# Patient Record
Sex: FEMALE | Race: WHITE | NOT HISPANIC OR LATINO | Employment: FULL TIME | URBAN - METROPOLITAN AREA
[De-identification: names, ages, dates, MRNs, and addresses within clinical notes are randomized per-mention and may not be internally consistent; named-entity substitution may affect disease eponyms.]

---

## 2017-10-30 ENCOUNTER — HOSPITAL ENCOUNTER (EMERGENCY)
Facility: HOSPITAL | Age: 37
Discharge: HOME | End: 2017-10-30
Attending: EMERGENCY MEDICINE | Admitting: EMERGENCY MEDICINE

## 2017-10-30 ENCOUNTER — APPOINTMENT (OUTPATIENT)
Dept: CT IMAGING | Facility: HOSPITAL | Age: 37
End: 2017-10-30

## 2017-10-30 VITALS
BODY MASS INDEX: 37.56 KG/M2 | RESPIRATION RATE: 16 BRPM | DIASTOLIC BLOOD PRESSURE: 66 MMHG | SYSTOLIC BLOOD PRESSURE: 112 MMHG | WEIGHT: 220 LBS | OXYGEN SATURATION: 98 % | TEMPERATURE: 98.1 F | HEART RATE: 93 BPM | HEIGHT: 64 IN

## 2017-10-30 DIAGNOSIS — R93.5 ABNORMAL CT OF THE ABDOMEN: ICD-10-CM

## 2017-10-30 DIAGNOSIS — K52.9 COLITIS PRESUMED INFECTIOUS: Primary | ICD-10-CM

## 2017-10-30 LAB
ALBUMIN SERPL-MCNC: 3.9 G/DL (ref 3.5–5.2)
ALBUMIN/GLOB SERPL: 1.1 G/DL
ALP SERPL-CCNC: 72 U/L (ref 39–117)
ALT SERPL W P-5'-P-CCNC: 24 U/L (ref 1–33)
ANION GAP SERPL CALCULATED.3IONS-SCNC: 14.3 MMOL/L
AST SERPL-CCNC: 20 U/L (ref 1–32)
BASOPHILS # BLD AUTO: 0.03 10*3/MM3 (ref 0–0.2)
BASOPHILS NFR BLD AUTO: 0.4 % (ref 0–1.5)
BILIRUB SERPL-MCNC: 0.4 MG/DL (ref 0.1–1.2)
BUN BLD-MCNC: 9 MG/DL (ref 6–20)
BUN/CREAT SERPL: 9.4 (ref 7–25)
CALCIUM SPEC-SCNC: 9.1 MG/DL (ref 8.6–10.5)
CHLORIDE SERPL-SCNC: 103 MMOL/L (ref 98–107)
CO2 SERPL-SCNC: 20.7 MMOL/L (ref 22–29)
CREAT BLD-MCNC: 0.96 MG/DL (ref 0.57–1)
DEPRECATED RDW RBC AUTO: 39.1 FL (ref 37–54)
EOSINOPHIL # BLD AUTO: 0.07 10*3/MM3 (ref 0–0.7)
EOSINOPHIL NFR BLD AUTO: 0.8 % (ref 0.3–6.2)
ERYTHROCYTE [DISTWIDTH] IN BLOOD BY AUTOMATED COUNT: 12.1 % (ref 11.7–13)
GFR SERPL CREATININE-BSD FRML MDRD: 65 ML/MIN/1.73
GLOBULIN UR ELPH-MCNC: 3.5 GM/DL
GLUCOSE BLD-MCNC: 92 MG/DL (ref 65–99)
HCT VFR BLD AUTO: 39.8 % (ref 35.6–45.5)
HGB BLD-MCNC: 14.4 G/DL (ref 11.9–15.5)
HOLD SPECIMEN: NORMAL
HOLD SPECIMEN: NORMAL
IMM GRANULOCYTES # BLD: 0.02 10*3/MM3 (ref 0–0.03)
IMM GRANULOCYTES NFR BLD: 0.2 % (ref 0–0.5)
LYMPHOCYTES # BLD AUTO: 1.64 10*3/MM3 (ref 0.9–4.8)
LYMPHOCYTES NFR BLD AUTO: 19.2 % (ref 19.6–45.3)
MCH RBC QN AUTO: 32.2 PG (ref 26.9–32)
MCHC RBC AUTO-ENTMCNC: 36.2 G/DL (ref 32.4–36.3)
MCV RBC AUTO: 89 FL (ref 80.5–98.2)
MONOCYTES # BLD AUTO: 1.1 10*3/MM3 (ref 0.2–1.2)
MONOCYTES NFR BLD AUTO: 12.9 % (ref 5–12)
NEUTROPHILS # BLD AUTO: 5.66 10*3/MM3 (ref 1.9–8.1)
NEUTROPHILS NFR BLD AUTO: 66.5 % (ref 42.7–76)
PLATELET # BLD AUTO: 164 10*3/MM3 (ref 140–500)
PMV BLD AUTO: 11.3 FL (ref 6–12)
POTASSIUM BLD-SCNC: 3.5 MMOL/L (ref 3.5–5.2)
PROT SERPL-MCNC: 7.4 G/DL (ref 6–8.5)
RBC # BLD AUTO: 4.47 10*6/MM3 (ref 3.9–5.2)
SODIUM BLD-SCNC: 138 MMOL/L (ref 136–145)
WBC NRBC COR # BLD: 8.52 10*3/MM3 (ref 4.5–10.7)
WHOLE BLOOD HOLD SPECIMEN: NORMAL
WHOLE BLOOD HOLD SPECIMEN: NORMAL

## 2017-10-30 PROCEDURE — 96360 HYDRATION IV INFUSION INIT: CPT

## 2017-10-30 PROCEDURE — 80053 COMPREHEN METABOLIC PANEL: CPT | Performed by: EMERGENCY MEDICINE

## 2017-10-30 PROCEDURE — 85025 COMPLETE CBC W/AUTO DIFF WBC: CPT | Performed by: EMERGENCY MEDICINE

## 2017-10-30 PROCEDURE — 99284 EMERGENCY DEPT VISIT MOD MDM: CPT

## 2017-10-30 PROCEDURE — 0 IOPAMIDOL 61 % SOLUTION: Performed by: EMERGENCY MEDICINE

## 2017-10-30 PROCEDURE — 74177 CT ABD & PELVIS W/CONTRAST: CPT

## 2017-10-30 RX ORDER — SODIUM CHLORIDE 0.9 % (FLUSH) 0.9 %
10 SYRINGE (ML) INJECTION AS NEEDED
Status: DISCONTINUED | OUTPATIENT
Start: 2017-10-30 | End: 2017-10-30 | Stop reason: HOSPADM

## 2017-10-30 RX ORDER — METRONIDAZOLE 500 MG/1
500 TABLET ORAL 3 TIMES DAILY
Qty: 30 TABLET | Refills: 0 | Status: SHIPPED | OUTPATIENT
Start: 2017-10-30 | End: 2018-10-29 | Stop reason: HOSPADM

## 2017-10-30 RX ADMIN — SODIUM CHLORIDE 500 ML: 9 INJECTION, SOLUTION INTRAVENOUS at 10:53

## 2017-10-30 RX ADMIN — IOPAMIDOL 85 ML: 612 INJECTION, SOLUTION INTRAVENOUS at 11:50

## 2018-02-08 ENCOUNTER — TRANSCRIBE ORDERS (OUTPATIENT)
Dept: ADMINISTRATIVE | Facility: HOSPITAL | Age: 38
End: 2018-02-08

## 2018-02-08 DIAGNOSIS — Z12.39 SCREENING BREAST EXAMINATION: Primary | ICD-10-CM

## 2018-02-22 ENCOUNTER — HOSPITAL ENCOUNTER (OUTPATIENT)
Dept: MAMMOGRAPHY | Facility: HOSPITAL | Age: 38
Discharge: HOME | End: 2018-02-22
Attending: OBSTETRICS & GYNECOLOGY | Admitting: OBSTETRICS & GYNECOLOGY

## 2018-02-22 DIAGNOSIS — Z12.39 SCREENING BREAST EXAMINATION: ICD-10-CM

## 2018-02-22 PROCEDURE — 77067 SCR MAMMO BI INCL CAD: CPT

## 2018-02-22 PROCEDURE — 77063 BREAST TOMOSYNTHESIS BI: CPT

## 2018-05-01 ENCOUNTER — TRANSCRIBE ORDERS (OUTPATIENT)
Dept: ADMINISTRATIVE | Facility: HOSPITAL | Age: 38
End: 2018-05-01

## 2018-05-01 DIAGNOSIS — R59.1 LYMPHADENOPATHY: Primary | ICD-10-CM

## 2018-10-29 ENCOUNTER — OFFICE VISIT (OUTPATIENT)
Dept: OBSTETRICS AND GYNECOLOGY | Facility: CLINIC | Age: 38
End: 2018-10-29

## 2018-10-29 VITALS
SYSTOLIC BLOOD PRESSURE: 118 MMHG | BODY MASS INDEX: 39.27 KG/M2 | HEIGHT: 64 IN | DIASTOLIC BLOOD PRESSURE: 80 MMHG | WEIGHT: 230 LBS

## 2018-10-29 DIAGNOSIS — Z30.433 ENCOUNTER FOR IUD REMOVAL AND REINSERTION: ICD-10-CM

## 2018-10-29 DIAGNOSIS — Z30.09 COUNSELING FOR BIRTH CONTROL REGARDING INTRAUTERINE DEVICE (IUD): Primary | ICD-10-CM

## 2018-10-29 PROCEDURE — 58301 REMOVE INTRAUTERINE DEVICE: CPT | Performed by: OBSTETRICS & GYNECOLOGY

## 2018-10-29 PROCEDURE — 99202 OFFICE O/P NEW SF 15 MIN: CPT | Performed by: OBSTETRICS & GYNECOLOGY

## 2018-10-29 PROCEDURE — 58300 INSERT INTRAUTERINE DEVICE: CPT | Performed by: OBSTETRICS & GYNECOLOGY

## 2018-10-29 NOTE — PROGRESS NOTES
Subjective   Chitra Mayfield is a 38 y.o. female.     Cc:  Contraception    History of Present Illness - Patient is a 38 year old female who presents for IUD placement.  Her last IUD was placed over 5 years ago.  It is Mirena IUD.  She does not have periods and would prefer another IUD.  This is her first visit to my office.    The following portions of the patient's history were reviewed and updated as appropriate:   She  has no past medical history on file.  She  has a past surgical history that includes Skin graft (Right); Incontinence surgery; and Tonsillectomy.  Her family history includes Arthritis in her mother; Breast cancer in her mother; Colon cancer in her maternal grandmother; Crohn's disease in her mother; Kawasaki disease in her father; Stroke in her paternal grandfather.  She  reports that she has been smoking.  She does not have any smokeless tobacco history on file. She reports that she drinks alcohol. She reports that she does not use drugs.  No current outpatient prescriptions on file.     No current facility-administered medications for this visit.      She has No Known Allergies..    Review of Systems   Constitutional: Negative for chills and fever.   Genitourinary: Negative for menstrual problem, pelvic pain, vaginal bleeding and vaginal pain.     Objective   Physical Exam   Constitutional: She appears well-developed and well-nourished.   HENT:   Right Ear: External ear normal.   Left Ear: External ear normal.   Nose: Nose normal.   Genitourinary: Vagina normal. Pelvic exam was performed with patient supine. There is no tenderness or lesion on the right labia. There is no tenderness or lesion on the left labia. Cervix exhibits no motion tenderness, no discharge and no friability. No foreign body in the vagina. No vaginal discharge found.   Musculoskeletal: Normal range of motion. She exhibits no edema.   Neurological: She is alert. Coordination normal.   Skin: Skin is warm and dry.    Psychiatric: She has a normal mood and affect. Her behavior is normal. Judgment and thought content normal.   Vitals reviewed.    IUD Removal Procedure Note    Type of IUD:  Mirena  Date of insertion:  Over 5 years ago.  Reason for removal:  Device expiration  Other relevant history/information:  none    Procedure Time Out Documentation      Procedure Details  IUD strings visible:  yes  Local anesthesia:  None  Tenaculum used:  None  Removal:  IUD strings grasped and IUD removed intact with gentle traction.  The patient tolerated the procedure well.    All appropriate instructions regarding removal were reviewed.    Patient tolerated the procedure well without complications.    Plans for contraception:  IUD    Other follow-up needed:  none    The patient was advised to call for any fever or for prolonged or severe pain or bleeding. She was advised to use NSAID as needed for mild to moderate pain.   IUD Insertion Procedure Note    Pre-operative Diagnosis: Desires Contraception    Post-operative Diagnosis: same    Indications: contraception    Procedure Details   Urine pregnancy test was not done.  The risks (including infection, bleeding, pain, and uterine perforation) and benefits of the procedure were explained to the patient and Verbal informed consent was obtained.      Cervix cleansed with Betadine. Uterus sounded to 7 cm. IUD inserted without difficulty. String visible and trimmed.    IUD Information:  Mirena, Lot # [id number], Expiration date 4/2021, NDC 07171-295-87.    Condition:  Stable    Complications:  None  Patient tolerated the procedure well without complications.    Plan:    The patient was advised to call for any fever or for prolonged or severe pain or bleeding. She was advised to use NSAID as needed for mild to moderate pain.     Assessment/Plan   Chitra was seen today for gynecologic exam.    Diagnoses and all orders for this visit:    Counseling for birth control regarding intrauterine device  (IUD)        -     Patient's history was taken and she wanted to proceed with IUD placement.  Encounter for IUD removal and reinsertion  -     levonorgestrel (MIRENA) 20 MCG/24HR IUD; by Intrauterine route 1 (One) Time.  -     Tolerated well.  Follow up in 4 weeks for IUD string check.    Facundo Ivory MD

## 2019-03-12 ENCOUNTER — TRANSCRIBE ORDERS (OUTPATIENT)
Dept: ADMINISTRATIVE | Facility: HOSPITAL | Age: 39
End: 2019-03-12

## 2019-03-12 DIAGNOSIS — Z12.39 BREAST CANCER SCREENING: Primary | ICD-10-CM

## 2019-03-26 ENCOUNTER — HOSPITAL ENCOUNTER (OUTPATIENT)
Dept: MAMMOGRAPHY | Facility: HOSPITAL | Age: 39
Discharge: HOME | End: 2019-03-26
Admitting: OBSTETRICS & GYNECOLOGY

## 2019-03-26 DIAGNOSIS — Z12.39 BREAST CANCER SCREENING: ICD-10-CM

## 2019-03-26 PROCEDURE — 77067 SCR MAMMO BI INCL CAD: CPT

## 2019-03-26 PROCEDURE — 77063 BREAST TOMOSYNTHESIS BI: CPT

## 2021-03-31 ENCOUNTER — BULK ORDERING (OUTPATIENT)
Dept: CASE MANAGEMENT | Facility: OTHER | Age: 41
End: 2021-03-31

## 2021-03-31 DIAGNOSIS — Z23 IMMUNIZATION DUE: ICD-10-CM

## 2021-04-30 ENCOUNTER — APPOINTMENT (OUTPATIENT)
Dept: WOMENS IMAGING | Facility: HOSPITAL | Age: 41
End: 2021-04-30

## 2021-04-30 ENCOUNTER — PROCEDURE VISIT (OUTPATIENT)
Dept: OBSTETRICS AND GYNECOLOGY | Facility: CLINIC | Age: 41
End: 2021-04-30

## 2021-04-30 ENCOUNTER — OFFICE VISIT (OUTPATIENT)
Dept: OBSTETRICS AND GYNECOLOGY | Facility: CLINIC | Age: 41
End: 2021-04-30

## 2021-04-30 VITALS
WEIGHT: 213 LBS | SYSTOLIC BLOOD PRESSURE: 113 MMHG | BODY MASS INDEX: 36.37 KG/M2 | DIASTOLIC BLOOD PRESSURE: 71 MMHG | HEIGHT: 64 IN

## 2021-04-30 DIAGNOSIS — Z12.31 VISIT FOR SCREENING MAMMOGRAM: Primary | ICD-10-CM

## 2021-04-30 DIAGNOSIS — Z72.0 TOBACCO USE: ICD-10-CM

## 2021-04-30 DIAGNOSIS — Z01.419 VISIT FOR GYNECOLOGIC EXAMINATION: Primary | ICD-10-CM

## 2021-04-30 PROCEDURE — 99396 PREV VISIT EST AGE 40-64: CPT | Performed by: OBSTETRICS & GYNECOLOGY

## 2021-04-30 PROCEDURE — 77067 SCR MAMMO BI INCL CAD: CPT | Performed by: OBSTETRICS & GYNECOLOGY

## 2021-04-30 PROCEDURE — 77063 BREAST TOMOSYNTHESIS BI: CPT | Performed by: OBSTETRICS & GYNECOLOGY

## 2021-04-30 PROCEDURE — 77067 SCR MAMMO BI INCL CAD: CPT | Performed by: RADIOLOGY

## 2021-04-30 PROCEDURE — 77063 BREAST TOMOSYNTHESIS BI: CPT | Performed by: RADIOLOGY

## 2021-04-30 NOTE — PATIENT INSTRUCTIONS
Steps to Quit Smoking  Smoking tobacco is the leading cause of preventable death. It can affect almost every organ in the body. Smoking puts you and those around you at risk for developing many serious chronic diseases. Quitting smoking can be difficult, but it is one of the best things that you can do for your health. It is never too late to quit.  How do I get ready to quit?  When you decide to quit smoking, create a plan to help you succeed. Before you quit:  · Pick a date to quit. Set a date within the next 2 weeks to give you time to prepare.  · Write down the reasons why you are quitting. Keep this list in places where you will see it often.  · Tell your family, friends, and co-workers that you are quitting. Support from your loved ones can make quitting easier.  · Talk with your health care provider about your options for quitting smoking.  · Find out what treatment options are covered by your health insurance.  · Identify people, places, things, and activities that make you want to smoke (triggers). Avoid them.  What first steps can I take to quit smoking?  · Throw away all cigarettes at home, at work, and in your car.  · Throw away smoking accessories, such as ashtrays and lighters.  · Clean your car. Make sure to empty the ashtray.  · Clean your home, including curtains and carpets.  What strategies can I use to quit smoking?  Talk with your health care provider about combining strategies, such as taking medicines while you are also receiving in-person counseling. Using these two strategies together makes you more likely to succeed in quitting than if you used either strategy on its own.  · If you are pregnant or breastfeeding, talk with your health care provider about finding counseling or other support strategies to quit smoking. Do not take medicine to help you quit smoking unless your health care provider tells you to do so.  To quit smoking:  Quit right away  · Quit smoking completely, instead of  gradually reducing how much you smoke over a period of time. Research shows that stopping smoking right away is more successful than gradually quitting.  · Attend in-person counseling to help you build problem-solving skills. You are more likely to succeed in quitting if you attend counseling sessions regularly. Even short sessions of 10 minutes can be effective.  Take medicine  You may take medicines to help you quit smoking. Some medicines require a prescription and some you can purchase over-the-counter. Medicines may have nicotine in them to replace the nicotine in cigarettes. Medicines may:  · Help to stop cravings.  · Help to relieve withdrawal symptoms.  Your health care provider may recommend:  · Nicotine patches, gum, or lozenges.  · Nicotine inhalers or sprays.  · Non-nicotine medicine that is taken by mouth.  Find resources  Find resources and support systems that can help you to quit smoking and remain smoke-free after you quit. These resources are most helpful when you use them often. They include:  · Online chats with a counselor.  · Telephone quitlines.  · Printed self-help materials.  · Support groups or group counseling.  · Text messaging programs.  · Mobile phone apps or applications. Use apps that can help you stick to your quit plan by providing reminders, tips, and encouragement. There are many free apps for mobile devices as well as websites. Examples include Quit Guide from the CDC and smokefree.gov  What things can I do to make it easier to quit?    · Reach out to your family and friends for support and encouragement. Call telephone quitlines (2-253-QUIT-NOW), reach out to support groups, or work with a counselor for support.  · Ask people who smoke to avoid smoking around you.  · Avoid places that trigger you to smoke, such as bars, parties, or smoke-break areas at work.  · Spend time with people who do not smoke.  · Lessen the stress in your life. Stress can be a smoking trigger for some  people. To lessen stress, try:  ? Exercising regularly.  ? Doing deep-breathing exercises.  ? Doing yoga.  ? Meditating.  ? Performing a body scan. This involves closing your eyes, scanning your body from head to toe, and noticing which parts of your body are particularly tense. Try to relax the muscles in those areas.  How will I feel when I quit smoking?  Day 1 to 3 weeks  Within the first 24 hours of quitting smoking, you may start to feel withdrawal symptoms. These symptoms are usually most noticeable 2-3 days after quitting, but they usually do not last for more than 2-3 weeks. You may experience these symptoms:  · Mood swings.  · Restlessness, anxiety, or irritability.  · Trouble concentrating.  · Dizziness.  · Strong cravings for sugary foods and nicotine.  · Mild weight gain.  · Constipation.  · Nausea.  · Coughing or a sore throat.  · Changes in how the medicines that you take for unrelated issues work in your body.  · Depression.  · Trouble sleeping (insomnia).  Week 3 and afterward  After the first 2-3 weeks of quitting, you may start to notice more positive results, such as:  · Improved sense of smell and taste.  · Decreased coughing and sore throat.  · Slower heart rate.  · Lower blood pressure.  · Clearer skin.  · The ability to breathe more easily.  · Fewer sick days.  Quitting smoking can be very challenging. Do not get discouraged if you are not successful the first time. Some people need to make many attempts to quit before they achieve long-term success. Do your best to stick to your quit plan, and talk with your health care provider if you have any questions or concerns.  Summary  · Smoking tobacco is the leading cause of preventable death. Quitting smoking is one of the best things that you can do for your health.  · When you decide to quit smoking, create a plan to help you succeed.  · Quit smoking right away, not slowly over a period of time.  · When you start quitting, seek help from your  health care provider, family, or friends.  This information is not intended to replace advice given to you by your health care provider. Make sure you discuss any questions you have with your health care provider.  Document Revised: 09/11/2020 Document Reviewed: 03/07/2020  Elsevier Patient Education © 2021 [employer] Inc.

## 2021-04-30 NOTE — PROGRESS NOTES
"[facility]  [address], [address]  [place], [place] [address]  Phone: [phone] / Fax:  [phone]      2021    Douglas PATEL [facility] [address]    Joelle Britton MD    Chief Complaint   Patient presents with   • Gynecologic Exam     Annual Exam, last pap 2016. Mammogram today. Patient with Mirena IUD in place since 10-29-18.       Chitra Mayfield is here for annual gynecologic exam.  HPI - Patient has IUD in place and has scant cycles.  She did a mammogram today. Her mother has been diagnosed with breast cancer with negative genetic testing.   Her last pap was in 2016.    History reviewed. No pertinent past medical history.    Past Surgical History:   Procedure Laterality Date   • INCONTINENCE SURGERY     • SKIN GRAFT Right    • TONSILLECTOMY         No Known Allergies    Social History     Socioeconomic History   • Marital status:      Spouse name: Not on file   • Number of children: Not on file   • Years of education: Not on file   • Highest education level: Not on file   Tobacco Use   • Smoking status: Current Every Day Smoker   • Tobacco comment: patient urged to quit   Substance and Sexual Activity   • Alcohol use: Yes   • Drug use: No   • Sexual activity: Yes     Birth control/protection: I.U.D.       Family History   Problem Relation Age of Onset   • Breast cancer Mother         X2   • Crohn's disease Mother    • Arthritis Mother    • Kawasaki disease Father    • Stroke Paternal Grandfather    • Colon cancer Maternal Grandmother        No LMP recorded. Patient has had an implant.    OB History        4    Para   2    Term   2            AB   1    Living           SAB   1    TAB        Ectopic        Molar        Multiple        Live Births   3                Vitals:    21 1054   BP: 113/71   Weight: 96.6 kg (213 lb)   Height: 162.6 cm (64\")       Physical Exam  Constitutional:       Appearance: She is well-developed.   Genitourinary:      Pelvic exam " was performed with patient in the lithotomy position.      Vulva, urethra, vagina and uterus normal.      No cervical motion tenderness or lesion.      IUD strings visualized.      Right adnexa not tender or full.      Left adnexa not tender or full.   HENT:      Right Ear: External ear normal.      Left Ear: External ear normal.      Nose: Nose normal.   Eyes:      Conjunctiva/sclera: Conjunctivae normal.   Neck:      Thyroid: No thyromegaly.   Cardiovascular:      Rate and Rhythm: Normal rate and regular rhythm.      Heart sounds: Normal heart sounds.   Pulmonary:      Effort: Pulmonary effort is normal.      Breath sounds: No stridor. No wheezing.   Chest:      Breasts:         Right: No mass or nipple discharge.         Left: No mass or nipple discharge.   Abdominal:      Palpations: Abdomen is soft. There is no mass.      Tenderness: There is no guarding or rebound.   Musculoskeletal:         General: Normal range of motion.      Cervical back: Normal range of motion and neck supple.   Neurological:      Mental Status: She is alert.      Coordination: Coordination normal.   Skin:     General: Skin is warm and dry.   Psychiatric:         Mood and Affect: Mood normal.         Behavior: Behavior normal.         Thought Content: Thought content normal.         Judgment: Judgment normal.   Vitals reviewed.         Diagnoses and all orders for this visit:    1. Visit for gynecologic examination (Primary)  -     IgP, Aptima HPV  -     Discussed importance of regular screening and breast awareness.    2. Tobacco use        -     Discussed importance of quitting smoking.      Facundo Ivory MD

## 2021-05-03 ENCOUNTER — TELEPHONE (OUTPATIENT)
Dept: OBSTETRICS AND GYNECOLOGY | Facility: CLINIC | Age: 41
End: 2021-05-03

## 2021-05-03 LAB
CYTOLOGIST CVX/VAG CYTO: ABNORMAL
CYTOLOGY CVX/VAG DOC CYTO: ABNORMAL
CYTOLOGY CVX/VAG DOC THIN PREP: ABNORMAL
DX ICD CODE: ABNORMAL
HIV 1 & 2 AB SER-IMP: ABNORMAL
HPV I/H RISK 4 DNA CVX QL PROBE+SIG AMP: POSITIVE
OTHER STN SPEC: ABNORMAL
STAT OF ADQ CVX/VAG CYTO-IMP: ABNORMAL

## 2021-05-04 NOTE — TELEPHONE ENCOUNTER
LAW - Her pap was normal but HPV testing was positive.  Please have her return for colposcopy.    Thanks    [place]

## 2021-05-20 ENCOUNTER — PROCEDURE VISIT (OUTPATIENT)
Dept: OBSTETRICS AND GYNECOLOGY | Facility: CLINIC | Age: 41
End: 2021-05-20

## 2021-05-20 VITALS
HEIGHT: 64 IN | BODY MASS INDEX: 36.37 KG/M2 | SYSTOLIC BLOOD PRESSURE: 112 MMHG | DIASTOLIC BLOOD PRESSURE: 76 MMHG | WEIGHT: 213 LBS

## 2021-05-20 DIAGNOSIS — R87.618 PAP SMEAR ABNORMALITY OF CERVIX/HUMAN PAPILLOMAVIRUS (HPV) POSITIVE: Primary | ICD-10-CM

## 2021-05-20 PROCEDURE — 57454 BX/CURETT OF CERVIX W/SCOPE: CPT | Performed by: OBSTETRICS & GYNECOLOGY

## 2021-05-20 NOTE — PROGRESS NOTES
Colposcopy Procedure Note    Indications: Pap smear 1 month ago showed: negative pap with positive high risk HPV. The prior pap showed no abnormalities.  Prior cervical/vaginal disease: none. Prior cervical treatment: no treatment.    Procedure Details   The risks and benefits of the procedure and Verbal informed consent obtained.    Speculum placed in vagina and excellent visualization of cervix achieved, cervix swabbed x 3 with acetic acid solution.    Findings:  Cervix: visible lesion(s) at 6 o'clock; endocervical curettage performed, cervical biopsies taken at 6 o'clock, specimen labelled and sent to pathology and hemostasis achieved with Monsel's solution.  Vaginal inspection: normal without visible lesions.  Vulvar colposcopy: vulvar colposcopy not performed.    Specimens: ectocervical biopsy and endocervical curettage    Complications: none.  Patient tolerated the procedure well without complications.    Plan:  Specimens labelled and sent to Pathology.  Will base further treatment on Pathology findings.  Treatment options discussed with patient.  Discussed biology of HPV and risks of smoking with HPV.  Patient encouraged to quit smoking.    Facundo Ivory MD

## 2021-05-24 LAB
DX ICD CODE: NORMAL
PATH REPORT.FINAL DX SPEC: NORMAL
PATH REPORT.GROSS SPEC: NORMAL
PATH REPORT.RELEVANT HX SPEC: NORMAL
PATH REPORT.SITE OF ORIGIN SPEC: NORMAL
PATHOLOGIST NAME: NORMAL
PAYMENT PROCEDURE: NORMAL

## 2021-06-02 ENCOUNTER — TELEPHONE (OUTPATIENT)
Dept: OBSTETRICS AND GYNECOLOGY | Facility: CLINIC | Age: 41
End: 2021-06-02

## 2021-06-02 NOTE — TELEPHONE ENCOUNTER
Patient aware of results and the need to follow-up. 6-2-21/lw  ----- Message from Facundo Ivory MD sent at 6/1/2021  7:16 PM EDT -----  LAW - Let her know that her testing was normal.  Patient should follow up for follow up in one year.

## 2022-05-02 ENCOUNTER — OFFICE VISIT (OUTPATIENT)
Dept: OBSTETRICS AND GYNECOLOGY | Facility: CLINIC | Age: 42
End: 2022-05-02

## 2022-05-02 ENCOUNTER — PROCEDURE VISIT (OUTPATIENT)
Dept: OBSTETRICS AND GYNECOLOGY | Facility: CLINIC | Age: 42
End: 2022-05-02

## 2022-05-02 ENCOUNTER — APPOINTMENT (OUTPATIENT)
Dept: WOMENS IMAGING | Facility: HOSPITAL | Age: 42
End: 2022-05-02

## 2022-05-02 VITALS
WEIGHT: 230 LBS | BODY MASS INDEX: 39.27 KG/M2 | SYSTOLIC BLOOD PRESSURE: 115 MMHG | HEIGHT: 64 IN | DIASTOLIC BLOOD PRESSURE: 69 MMHG

## 2022-05-02 DIAGNOSIS — Z12.31 VISIT FOR SCREENING MAMMOGRAM: Primary | ICD-10-CM

## 2022-05-02 DIAGNOSIS — R87.618 PAP SMEAR ABNORMALITY OF CERVIX/HUMAN PAPILLOMAVIRUS (HPV) POSITIVE: ICD-10-CM

## 2022-05-02 DIAGNOSIS — Z01.419 VISIT FOR GYNECOLOGIC EXAMINATION: Primary | ICD-10-CM

## 2022-05-02 DIAGNOSIS — Z72.0 TOBACCO USE: ICD-10-CM

## 2022-05-02 DIAGNOSIS — Z80.3 FAMILY HISTORY OF BREAST CANCER: ICD-10-CM

## 2022-05-02 PROCEDURE — 77067 SCR MAMMO BI INCL CAD: CPT | Performed by: RADIOLOGY

## 2022-05-02 PROCEDURE — 77067 SCR MAMMO BI INCL CAD: CPT | Performed by: OBSTETRICS & GYNECOLOGY

## 2022-05-02 PROCEDURE — 99396 PREV VISIT EST AGE 40-64: CPT | Performed by: OBSTETRICS & GYNECOLOGY

## 2022-05-02 PROCEDURE — 77063 BREAST TOMOSYNTHESIS BI: CPT | Performed by: RADIOLOGY

## 2022-05-02 PROCEDURE — 77063 BREAST TOMOSYNTHESIS BI: CPT | Performed by: OBSTETRICS & GYNECOLOGY

## 2022-05-02 NOTE — PROGRESS NOTES
"[facility]  [address], [address]  [place], [place] [address]  Phone: [phone] / Fax:  [phone]      2022    Douglas PATEL [facility] [address]    Joelle Britton MD    Chief Complaint   Patient presents with   • Gynecologic Exam      Annual Exam, last pap 4-30-21 NL HPV (+), Colpo  5-20-21 NL.. Mammogram today. Patient with Mirena IUD in place since 10-29-18.       Chitra Mayfield is here for annual gynecologic exam.  HPI - Patient with HPV positive pap last year.  She underwent colposcopy that was normal.  Patient had normal mammogram last year and underwent screening again today.  She has an IUD in place and does not have periods.    Past Medical History:   Diagnosis Date   • HPV (human papilloma virus) infection        Past Surgical History:   Procedure Laterality Date   • COLPOSCOPY  2021   • INCONTINENCE SURGERY     • SKIN GRAFT Right    • TONSILLECTOMY         No Known Allergies    Social History     Socioeconomic History   • Marital status:    Tobacco Use   • Smoking status: Current Every Day Smoker     Types: Cigarettes   • Tobacco comment: patient urged to quit   Vaping Use   • Vaping Use: Former   Substance and Sexual Activity   • Alcohol use: Not Currently   • Drug use: No   • Sexual activity: Not Currently     Birth control/protection: I.U.D.       Family History   Problem Relation Age of Onset   • Breast cancer Mother         X2   • Crohn's disease Mother    • Arthritis Mother    • Kawasaki disease Father    • Stroke Paternal Grandfather    • Colon cancer Maternal Grandmother        No LMP recorded. Patient has had an implant.    OB History        4    Para   2    Term   2            AB   1    Living           SAB   1    IAB        Ectopic        Molar        Multiple        Live Births   3                Vitals:    22 0911   BP: 115/69   Weight: 104 kg (230 lb)   Height: 162.6 cm (64.02\")       Physical Exam  Constitutional:       " Appearance: Normal appearance. She is well-developed.   Genitourinary:      Bladder normal.      Right Labia: No tenderness or lesions.     Left Labia: No tenderness or lesions.     No vaginal discharge or tenderness.        Right Adnexa: not tender and not full.     Left Adnexa: not tender and not full.     No cervical motion tenderness or lesion.      Uterus is not enlarged or tender.      No urethral tenderness or hypermobility present.   Breasts:      Right: No mass or nipple discharge.      Left: No mass or nipple discharge.       HENT:      Right Ear: External ear normal.      Left Ear: External ear normal.      Nose: Nose normal.   Eyes:      Conjunctiva/sclera: Conjunctivae normal.   Neck:      Thyroid: No thyromegaly.   Cardiovascular:      Rate and Rhythm: Normal rate and regular rhythm.      Heart sounds: Normal heart sounds.   Pulmonary:      Effort: Pulmonary effort is normal.      Breath sounds: No stridor. No wheezing.   Abdominal:      Palpations: Abdomen is soft. There is no mass.      Tenderness: There is no guarding or rebound.   Musculoskeletal:         General: Normal range of motion.      Cervical back: Normal range of motion and neck supple.   Neurological:      Mental Status: She is alert.      Coordination: Coordination normal.   Skin:     General: Skin is warm and dry.   Psychiatric:         Mood and Affect: Mood normal.         Behavior: Behavior normal.         Thought Content: Thought content normal.         Judgment: Judgment normal.   Vitals reviewed. Exam conducted with a chaperone present.         Diagnoses and all orders for this visit:    1. Visit for gynecologic examination (Primary)        -     Discussed importance of regular screening and breast awareness.        -     Patient has been vaccinated against Covid 19.  2. Pap smear abnormality of cervix/human papillomavirus (HPV) positive  -     IgP, Aptima HPV  -     If pap abnormal, consider colposcopy.  3. Family history of  breast cancer        -     Mammogram yearly.  Mother with negative genetic testing.  4. Tobacco use        -     Recommended quitting smoking, especially in context of abnormal pap tests.      Facundo Ivory MD

## 2022-05-02 NOTE — PATIENT INSTRUCTIONS
Steps to Quit Smoking  Smoking tobacco is the leading cause of preventable death. It can affect almost every organ in the body. Smoking puts you and those around you at risk for developing many serious chronic diseases. Quitting smoking can be difficult, but it is one of the best things that you can do for your health. It is never too late to quit.  How do I get ready to quit?  When you decide to quit smoking, create a plan to help you succeed. Before you quit:  · Pick a date to quit. Set a date within the next 2 weeks to give you time to prepare.  · Write down the reasons why you are quitting. Keep this list in places where you will see it often.  · Tell your family, friends, and co-workers that you are quitting. Support from your loved ones can make quitting easier.  · Talk with your health care provider about your options for quitting smoking.  · Find out what treatment options are covered by your health insurance.  · Identify people, places, things, and activities that make you want to smoke (triggers). Avoid them.  What first steps can I take to quit smoking?  · Throw away all cigarettes at home, at work, and in your car.  · Throw away smoking accessories, such as ashtrays and lighters.  · Clean your car. Make sure to empty the ashtray.  · Clean your home, including curtains and carpets.  What strategies can I use to quit smoking?  Talk with your health care provider about combining strategies, such as taking medicines while you are also receiving in-person counseling. Using these two strategies together makes you more likely to succeed in quitting than if you used either strategy on its own.  · If you are pregnant or breastfeeding, talk with your health care provider about finding counseling or other support strategies to quit smoking. Do not take medicine to help you quit smoking unless your health care provider tells you to do so.  To quit smoking:  Quit right away  · Quit smoking completely, instead of  gradually reducing how much you smoke over a period of time. Research shows that stopping smoking right away is more successful than gradually quitting.  · Attend in-person counseling to help you build problem-solving skills. You are more likely to succeed in quitting if you attend counseling sessions regularly. Even short sessions of 10 minutes can be effective.  Take medicine  You may take medicines to help you quit smoking. Some medicines require a prescription and some you can purchase over-the-counter. Medicines may have nicotine in them to replace the nicotine in cigarettes. Medicines may:  · Help to stop cravings.  · Help to relieve withdrawal symptoms.  Your health care provider may recommend:  · Nicotine patches, gum, or lozenges.  · Nicotine inhalers or sprays.  · Non-nicotine medicine that is taken by mouth.  Find resources  Find resources and support systems that can help you to quit smoking and remain smoke-free after you quit. These resources are most helpful when you use them often. They include:  · Online chats with a counselor.  · Telephone quitlines.  · Printed self-help materials.  · Support groups or group counseling.  · Text messaging programs.  · Mobile phone apps or applications. Use apps that can help you stick to your quit plan by providing reminders, tips, and encouragement. There are many free apps for mobile devices as well as websites. Examples include Quit Guide from the CDC and smokefree.gov  What things can I do to make it easier to quit?    · Reach out to your family and friends for support and encouragement. Call telephone quitlines (8-857-QUIT-NOW), reach out to support groups, or work with a counselor for support.  · Ask people who smoke to avoid smoking around you.  · Avoid places that trigger you to smoke, such as bars, parties, or smoke-break areas at work.  · Spend time with people who do not smoke.  · Lessen the stress in your life. Stress can be a smoking trigger for some  people. To lessen stress, try:  ? Exercising regularly.  ? Doing deep-breathing exercises.  ? Doing yoga.  ? Meditating.  ? Performing a body scan. This involves closing your eyes, scanning your body from head to toe, and noticing which parts of your body are particularly tense. Try to relax the muscles in those areas.  How will I feel when I quit smoking?  Day 1 to 3 weeks  Within the first 24 hours of quitting smoking, you may start to feel withdrawal symptoms. These symptoms are usually most noticeable 2-3 days after quitting, but they usually do not last for more than 2-3 weeks. You may experience these symptoms:  · Mood swings.  · Restlessness, anxiety, or irritability.  · Trouble concentrating.  · Dizziness.  · Strong cravings for sugary foods and nicotine.  · Mild weight gain.  · Constipation.  · Nausea.  · Coughing or a sore throat.  · Changes in how the medicines that you take for unrelated issues work in your body.  · Depression.  · Trouble sleeping (insomnia).  Week 3 and afterward  After the first 2-3 weeks of quitting, you may start to notice more positive results, such as:  · Improved sense of smell and taste.  · Decreased coughing and sore throat.  · Slower heart rate.  · Lower blood pressure.  · Clearer skin.  · The ability to breathe more easily.  · Fewer sick days.  Quitting smoking can be very challenging. Do not get discouraged if you are not successful the first time. Some people need to make many attempts to quit before they achieve long-term success. Do your best to stick to your quit plan, and talk with your health care provider if you have any questions or concerns.  Summary  · Smoking tobacco is the leading cause of preventable death. Quitting smoking is one of the best things that you can do for your health.  · When you decide to quit smoking, create a plan to help you succeed.  · Quit smoking right away, not slowly over a period of time.  · When you start quitting, seek help from your  health care provider, family, or friends.  This information is not intended to replace advice given to you by your health care provider. Make sure you discuss any questions you have with your health care provider.  Document Revised: 09/11/2020 Document Reviewed: 03/07/2020  Elsevier Patient Education © 2021 [employer] Inc.

## 2022-05-09 ENCOUNTER — TELEPHONE (OUTPATIENT)
Dept: OBSTETRICS AND GYNECOLOGY | Facility: CLINIC | Age: 42
End: 2022-05-09

## 2022-05-09 NOTE — TELEPHONE ENCOUNTER
Spoke to patient she is aware of Positive HPV and is scheduled for Monday June 6th. 5-9-22/lw  ----- Message from Facundo Ivory MD sent at 5/8/2022 10:14 PM EDT -----  LAW - Let her know that she had another pap tet reading with HPV.  I would like her to follow up with colposcopy in the next 2 to 4 weeks.

## 2022-05-24 ENCOUNTER — TELEPHONE (OUTPATIENT)
Dept: OBSTETRICS AND GYNECOLOGY | Facility: CLINIC | Age: 42
End: 2022-05-24

## 2022-05-24 NOTE — TELEPHONE ENCOUNTER
Caller: ROSALINDA CHAU    Relationship to patient: SELF    Best call back number: [phone]    Patient is needing: ROSALINDA CHAU IS REQUESTING TO RESCHEDULE IN-OFFICE COLPO ON 6/6/22 AT 11:15 W/ DR. BURDICK TO 6/13/22, IF POSSIBLE. PLEASE CALL PT BACK [phone].

## 2022-06-13 ENCOUNTER — PROCEDURE VISIT (OUTPATIENT)
Dept: OBSTETRICS AND GYNECOLOGY | Facility: CLINIC | Age: 42
End: 2022-06-13

## 2022-06-13 VITALS
SYSTOLIC BLOOD PRESSURE: 117 MMHG | BODY MASS INDEX: 39.09 KG/M2 | DIASTOLIC BLOOD PRESSURE: 71 MMHG | WEIGHT: 229 LBS | HEIGHT: 64 IN

## 2022-06-13 DIAGNOSIS — R87.810 PAPANICOLAOU SMEAR OF CERVIX WITH POSITIVE HIGH RISK HUMAN PAPILLOMA VIRUS (HPV) TEST: Primary | ICD-10-CM

## 2022-06-13 PROCEDURE — 57452 EXAM OF CERVIX W/SCOPE: CPT | Performed by: OBSTETRICS & GYNECOLOGY

## 2022-06-13 NOTE — PROGRESS NOTES
Colposcopy Procedure Note    Indications: Pap smear 1 months ago showed: high risk HPV positive test with normal pap. The prior pap showed high risk HPV positive.  Prior cervical/vaginal disease: normal exam without visible pathology. Prior cervical treatment: no treatment.    Procedure Details   The risks and benefits of the procedure and Verbal informed consent obtained.    Speculum placed in vagina and excellent visualization of cervix achieved, cervix swabbed x 3 with acetic acid solution.    Findings:  Cervix: no visible lesions; no biopsies taken.  Vaginal inspection: normal without visible lesions.  Vulvar colposcopy: vulvar colposcopy not performed.    Specimens: none    Complications: none.  Patient tolerated the procedure well without complications.    Plan:  Follow up in one year.  Discussed importance of quitting smoking and importance of pap in one year to reassess for dysplasia.    Facundo Ivory MD

## 2023-05-01 ENCOUNTER — TELEPHONE (OUTPATIENT)
Dept: OBSTETRICS AND GYNECOLOGY | Facility: CLINIC | Age: 43
End: 2023-05-01
Payer: COMMERCIAL

## 2023-05-01 NOTE — TELEPHONE ENCOUNTER
Provider: HEAVENLY  Caller: ROSALINDA CHAU  Relationship to Patient: SELF  Phone Number: [phone]-CALL ANYTIME, IS IT OKAY TO [facility].    Reason for Call: PATIENT IS SCHEDULED FOR ANNUAL AND MAMMOGRAM ON 05.08.23. PATIENT WOULD TO KNOW IF MIRENA CAN BE REPLACED AT ANNUAL APPT.

## 2023-05-08 ENCOUNTER — PROCEDURE VISIT (OUTPATIENT)
Dept: OBSTETRICS AND GYNECOLOGY | Facility: CLINIC | Age: 43
End: 2023-05-08
Payer: COMMERCIAL

## 2023-05-08 ENCOUNTER — OFFICE VISIT (OUTPATIENT)
Dept: OBSTETRICS AND GYNECOLOGY | Facility: CLINIC | Age: 43
End: 2023-05-08
Payer: COMMERCIAL

## 2023-05-08 VITALS
HEIGHT: 64 IN | DIASTOLIC BLOOD PRESSURE: 72 MMHG | SYSTOLIC BLOOD PRESSURE: 109 MMHG | WEIGHT: 243 LBS | BODY MASS INDEX: 41.48 KG/M2

## 2023-05-08 DIAGNOSIS — R87.618 PAP SMEAR ABNORMALITY OF CERVIX/HUMAN PAPILLOMAVIRUS (HPV) POSITIVE: ICD-10-CM

## 2023-05-08 DIAGNOSIS — Z12.31 VISIT FOR SCREENING MAMMOGRAM: Primary | ICD-10-CM

## 2023-05-08 DIAGNOSIS — Z01.419 VISIT FOR GYNECOLOGIC EXAMINATION: Primary | ICD-10-CM

## 2023-05-08 DIAGNOSIS — Z72.0 TOBACCO USE: ICD-10-CM

## 2023-05-08 NOTE — PROGRESS NOTES
[facility]  [address]  [place], [place] [address]  Phone: [phone] / Fax:  [phone]      2023    [address] PAT[address] [facility] [address]    Joelle Britton MD    Chief Complaint   Patient presents with   • Gynecologic Exam      Annual Exam, last pap 5-2-22 NL HPV (+), Colpo 22 No Specimens were taken. -30-21 NL HPV (+), Colpo  --21 NL.. Mammogram today, previous 5-2-22 NL.. Patient with Mirena IUD in place since 10-29-18.       Chitra Mayfield is here for annual gynecologic exam.  HPI - Patient with history of HPV positive pap with negative colposcopy.  Last pap was one year ago.  She had a normal mammogram one year ago and repeated screening today.  She has an IUD in place since 2018 and has scant bleeding.    Past Medical History:   Diagnosis Date   • HPV (human papilloma virus) infection 2021       Past Surgical History:   Procedure Laterality Date   • COLPOSCOPY  2021    WNL   • COLPOSCOPY  2022   • INCONTINENCE SURGERY     • SKIN GRAFT Right    • TONSILLECTOMY         No Known Allergies    Social History     Socioeconomic History   • Marital status:    Tobacco Use   • Smoking status: Every Day     Types: Cigarettes   • Tobacco comments:     patient urged to quit   Vaping Use   • Vaping Use: Former   Substance and Sexual Activity   • Alcohol use: Not Currently   • Drug use: Yes     Types: Marijuana   • Sexual activity: Not Currently     Birth control/protection: I.U.D.       Family History   Problem Relation Age of Onset   • Breast cancer Mother         X2   • Crohn's disease Mother    • Arthritis Mother    • Kawasaki disease Father    • Stroke Paternal Grandfather    • Colon cancer Maternal Grandmother        No LMP recorded. Patient has had an implant.    OB History        4    Para   2    Term   2            AB   1    Living           SAB   1    IAB        Ectopic        Molar        Multiple        Live Births   3  "               Vitals:    05/08/23 0922   BP: 109/72   Weight: 110 kg (243 lb)   Height: 162.6 cm (64.02\")       Physical Exam  Constitutional:       Appearance: Normal appearance. She is well-developed.   Genitourinary:      Urethral meatus normal.      Right Labia: No tenderness or lesions.     Left Labia: No lesions.     No vaginal discharge or tenderness.        Right Adnexa: not tender and not full.     Left Adnexa: not tender and not full.     No cervical motion tenderness or lesion.      IUD strings visualized.      Uterus is not enlarged or tender.      No urethral tenderness or hypermobility present.   Breasts:     Right: No mass or nipple discharge.      Left: No mass or nipple discharge.   HENT:      Right Ear: External ear normal.      Left Ear: External ear normal.      Nose: Nose normal.   Eyes:      Conjunctiva/sclera: Conjunctivae normal.   Neck:      Thyroid: No thyromegaly.   Cardiovascular:      Rate and Rhythm: Normal rate and regular rhythm.      Heart sounds: Normal heart sounds.   Pulmonary:      Effort: Pulmonary effort is normal.      Breath sounds: No stridor. No wheezing.   Abdominal:      Palpations: Abdomen is soft. There is no mass.      Tenderness: There is no guarding or rebound.   Musculoskeletal:         General: Normal range of motion.      Cervical back: Normal range of motion and neck supple.   Neurological:      Mental Status: She is alert.      Coordination: Coordination normal.   Skin:     General: Skin is warm and dry.   Psychiatric:         Behavior: Behavior normal.         Thought Content: Thought content normal.         Judgment: Judgment normal.   Vitals reviewed. Exam conducted with a chaperone present.         Diagnoses and all orders for this visit:    1. Visit for gynecologic examination (Primary)        -     Discussed importance of regular screening and breast awareness        -     Discussed IUD for contraception and interval to change IUD.  2. Pap smear " abnormality of cervix/human papillomavirus (HPV) positive  -     IgP, Aptima HPV  -     If pap abnormal, consider colposcopy OR repeat in one year if unchanged.  3. Tobacco use        -     Discussed importance of quitting smoking.      Facundo Ivory MD

## 2023-05-08 NOTE — PATIENT INSTRUCTIONS
Steps to Quit Smoking  Smoking tobacco is the leading cause of preventable death. It can affect almost every organ in the body. Smoking puts you and those around you at risk for developing many serious chronic diseases. Quitting smoking can be difficult, but it is one of the best things that you can do for your health. It is never too late to quit.  How do I get ready to quit?  When you decide to quit smoking, create a plan to help you succeed. Before you quit:  • Pick a date to quit. Set a date within the next 2 weeks to give you time to prepare.  • Write down the reasons why you are quitting. Keep this list in places where you will see it often.  • Tell your family, friends, and co-workers that you are quitting. Support from your loved ones can make quitting easier.  • Talk with your health care provider about your options for quitting smoking.  • Find out what treatment options are covered by your health insurance.  • Identify people, places, things, and activities that make you want to smoke (triggers). Avoid them.  What first steps can I take to quit smoking?  • Throw away all cigarettes at home, at work, and in your car.  • Throw away smoking accessories, such as ashtrays and lighters.  • Clean your car. Make sure to empty the ashtray.  • Clean your home, including curtains and carpets.  What strategies can I use to quit smoking?  Talk with your health care provider about combining strategies, such as taking medicines while you are also receiving in-person counseling. Using these two strategies together makes you more likely to succeed in quitting than if you used either strategy on its own.  • If you are pregnant or breastfeeding, talk with your health care provider about finding counseling or other support strategies to quit smoking. Do not take medicine to help you quit smoking unless your health care provider tells you to do so.  To quit smoking:  Quit right away  • Quit smoking completely, instead of  gradually reducing how much you smoke over a period of time. Research shows that stopping smoking right away is more successful than gradually quitting.  • Attend in-person counseling to help you build problem-solving skills. You are more likely to succeed in quitting if you attend counseling sessions regularly. Even short sessions of 10 minutes can be effective.  Take medicine  You may take medicines to help you quit smoking. Some medicines require a prescription and some you can purchase over-the-counter. Medicines may have nicotine in them to replace the nicotine in cigarettes. Medicines may:  • Help to stop cravings.  • Help to relieve withdrawal symptoms.  Your health care provider may recommend:  • Nicotine patches, gum, or lozenges.  • Nicotine inhalers or sprays.  • Non-nicotine medicine that is taken by mouth.  Find resources  Find resources and support systems that can help you to quit smoking and remain smoke-free after you quit. These resources are most helpful when you use them often. They include:  • Online chats with a counselor.  • Telephone quitlines.  • Printed self-help materials.  • Support groups or group counseling.  • Text messaging programs.  • Mobile phone apps or applications. Use apps that can help you stick to your quit plan by providing reminders, tips, and encouragement. There are many free apps for mobile devices as well as websites. Examples include Quit Guide from the CDC and smokefree.gov  What things can I do to make it easier to quit?    • Reach out to your family and friends for support and encouragement. Call telephone quitlines (5-589-QUIT-NOW), reach out to support groups, or work with a counselor for support.  • Ask people who smoke to avoid smoking around you.  • Avoid places that trigger you to smoke, such as bars, parties, or smoke-break areas at work.  • Spend time with people who do not smoke.  • Lessen the stress in your life. Stress can be a smoking trigger for some  people. To lessen stress, try:  ? Exercising regularly.  ? Doing deep-breathing exercises.  ? Doing yoga.  ? Meditating.  ? Performing a body scan. This involves closing your eyes, scanning your body from head to toe, and noticing which parts of your body are particularly tense. Try to relax the muscles in those areas.  How will I feel when I quit smoking?  Day 1 to 3 weeks  Within the first 24 hours of quitting smoking, you may start to feel withdrawal symptoms. These symptoms are usually most noticeable 2-3 days after quitting, but they usually do not last for more than 2-3 weeks. You may experience these symptoms:  • Mood swings.  • Restlessness, anxiety, or irritability.  • Trouble concentrating.  • Dizziness.  • Strong cravings for sugary foods and nicotine.  • Mild weight gain.  • Constipation.  • Nausea.  • Coughing or a sore throat.  • Changes in how the medicines that you take for unrelated issues work in your body.  • Depression.  • Trouble sleeping (insomnia).  Week 3 and afterward  After the first 2-3 weeks of quitting, you may start to notice more positive results, such as:  • Improved sense of smell and taste.  • Decreased coughing and sore throat.  • Slower heart rate.  • Lower blood pressure.  • Clearer skin.  • The ability to breathe more easily.  • Fewer sick days.  Quitting smoking can be very challenging. Do not get discouraged if you are not successful the first time. Some people need to make many attempts to quit before they achieve long-term success. Do your best to stick to your quit plan, and talk with your health care provider if you have any questions or concerns.  Summary  • Smoking tobacco is the leading cause of preventable death. Quitting smoking is one of the best things that you can do for your health.  • When you decide to quit smoking, create a plan to help you succeed.  • Quit smoking right away, not slowly over a period of time.  • When you start quitting, seek help from your  health care provider, family, or friends.  This information is not intended to replace advice given to you by your health care provider. Make sure you discuss any questions you have with your health care provider.  Document Revised: 08/26/2022 Document Reviewed: 03/07/2020  Elsevier Patient Education © 2022 Elsevier Inc.

## 2023-05-16 LAB
CYTOLOGIST CVX/VAG CYTO: NORMAL
CYTOLOGY CVX/VAG DOC CYTO: NORMAL
CYTOLOGY CVX/VAG DOC THIN PREP: NORMAL
DX ICD CODE: NORMAL
HIV 1 & 2 AB SER-IMP: NORMAL
HPV I/H RISK 4 DNA CVX QL PROBE+SIG AMP: NEGATIVE
OTHER STN SPEC: NORMAL
PATHOLOGIST CVX/VAG CYTO: NORMAL
STAT OF ADQ CVX/VAG CYTO-IMP: NORMAL

## 2024-05-13 ENCOUNTER — OFFICE VISIT (OUTPATIENT)
Dept: OBSTETRICS AND GYNECOLOGY | Facility: CLINIC | Age: 44
End: 2024-05-13
Payer: COMMERCIAL

## 2024-05-13 ENCOUNTER — PROCEDURE VISIT (OUTPATIENT)
Dept: OBSTETRICS AND GYNECOLOGY | Facility: CLINIC | Age: 44
End: 2024-05-13
Payer: COMMERCIAL

## 2024-05-13 VITALS
HEIGHT: 64 IN | WEIGHT: 241 LBS | DIASTOLIC BLOOD PRESSURE: 77 MMHG | SYSTOLIC BLOOD PRESSURE: 125 MMHG | BODY MASS INDEX: 41.15 KG/M2

## 2024-05-13 DIAGNOSIS — Z12.31 VISIT FOR SCREENING MAMMOGRAM: Primary | ICD-10-CM

## 2024-05-13 DIAGNOSIS — Z01.419 VISIT FOR GYNECOLOGIC EXAMINATION: Primary | ICD-10-CM

## 2024-05-13 DIAGNOSIS — R87.810 PAP SMEAR OF CERVIX SHOWS HIGH RISK HPV PRESENT: ICD-10-CM

## 2024-05-13 PROCEDURE — 1159F MED LIST DOCD IN RCRD: CPT | Performed by: OBSTETRICS & GYNECOLOGY

## 2024-05-13 PROCEDURE — 99396 PREV VISIT EST AGE 40-64: CPT | Performed by: OBSTETRICS & GYNECOLOGY

## 2024-05-13 PROCEDURE — 1160F RVW MEDS BY RX/DR IN RCRD: CPT | Performed by: OBSTETRICS & GYNECOLOGY

## 2024-05-13 PROCEDURE — 77063 BREAST TOMOSYNTHESIS BI: CPT | Performed by: OBSTETRICS & GYNECOLOGY

## 2024-05-13 PROCEDURE — 77067 SCR MAMMO BI INCL CAD: CPT | Performed by: OBSTETRICS & GYNECOLOGY

## 2024-05-13 RX ORDER — ERGOCALCIFEROL 1.25 MG/1
1 CAPSULE ORAL WEEKLY
COMMUNITY
Start: 2024-03-20

## 2024-05-13 RX ORDER — SUMATRIPTAN 50 MG/1
50 TABLET, FILM COATED ORAL
COMMUNITY
Start: 2024-02-13

## 2024-05-13 NOTE — PROGRESS NOTES
[facility]  [address]  [place], [place] [address]  Phone: [phone] / Fax:  [phone]      2024    [address] PATEL [facility] [address]    Joelle Britton MD    Chief Complaint   Patient presents with    Gynecologic Exam     Annual Exam, last pap 5-8-23 NL HPV (-), 5--22 NL HPV (+), Colpo 22 No Specimens were taken. -30- NL HPV (+), Colpo  5-20-21 NL.. Mammogram today, previous 5-8-23 NL.. Patient with Mirena IUD in place since 10-29-18.       Chitra Mayfield is here for annual gynecologic exam.  HPI - Patient with history of persistent/recurrent HPV with negative testing.  Last pap was negative one year ago.  She had a normal mammogram in  and underwent screening again today.  Patient with IUD in place over 5.5 years ago and recently has resumption of menses.    Past Medical History:   Diagnosis Date    HPV (human papilloma virus) infection 2021       Past Surgical History:   Procedure Laterality Date    COLPOSCOPY  2021    WNL    COLPOSCOPY  2022    INCONTINENCE SURGERY      SKIN GRAFT Right     TONSILLECTOMY         No Known Allergies    Social History     Socioeconomic History    Marital status:    Tobacco Use    Smoking status: Some Days     Types: Cigarettes    Tobacco comments:     patient urged to quit   Vaping Use    Vaping status: Never Used   Substance and Sexual Activity    Alcohol use: Not Currently    Drug use: Yes     Types: Marijuana    Sexual activity: Not Currently     Birth control/protection: I.U.D.       Family History   Problem Relation Age of Onset    Breast cancer Mother         X2    Crohn's disease Mother     Arthritis Mother     Kawasaki disease Father     Stroke Paternal Grandfather     Colon cancer Maternal Grandmother        No LMP recorded. Patient has had an implant.    OB History          4    Para   2    Term   2            AB   1    Living             SAB   1    IAB        Ectopic  "       Molar        Multiple        Live Births   3                Vitals:    05/13/24 0904   BP: 125/77   Weight: 109 kg (241 lb)   Height: 162.6 cm (64\")       Physical Exam  Constitutional:       Appearance: Normal appearance. She is well-developed.   Genitourinary:      Bladder and urethral meatus normal.      Right Labia: No tenderness or lesions.     Left Labia: No tenderness or lesions.     No vaginal discharge or tenderness.        Right Adnexa: not tender and not full.     Left Adnexa: not tender and not full.     No cervical motion tenderness or lesion.      Uterus is not enlarged or tender.      No urethral tenderness or hypermobility present.   Breasts:     Right: No mass or nipple discharge.      Left: No mass or nipple discharge.   HENT:      Right Ear: External ear normal.      Left Ear: External ear normal.      Nose: Nose normal.   Eyes:      Conjunctiva/sclera: Conjunctivae normal.   Neck:      Thyroid: No thyromegaly.   Cardiovascular:      Rate and Rhythm: Normal rate and regular rhythm.      Heart sounds: Normal heart sounds.   Pulmonary:      Effort: Pulmonary effort is normal.      Breath sounds: No stridor. No wheezing.   Abdominal:      Palpations: Abdomen is soft.      Tenderness: There is no abdominal tenderness. There is no guarding or rebound.   Musculoskeletal:         General: Normal range of motion.      Cervical back: Normal range of motion and neck supple.   Neurological:      Mental Status: She is alert.      Coordination: Coordination normal.   Skin:     General: Skin is warm and dry.   Psychiatric:         Mood and Affect: Mood normal.         Behavior: Behavior normal.         Thought Content: Thought content normal.         Judgment: Judgment normal.   Vitals reviewed. Exam conducted with a chaperone present.         Diagnoses and all orders for this visit:    1. Visit for gynecologic examination (Primary)  -     IgP, Aptima HPV  -     Discussed importance of regular screening " and breast awareness.  -     Patient to be scheduled for IUD replacement.    2. Pap smear of cervix shows high risk HPV present        -     If pap abnormal, patient to undergo colposcopy.      Facundo Ivory MD

## 2024-05-16 LAB
CYTOLOGIST CVX/VAG CYTO: NORMAL
CYTOLOGY CVX/VAG DOC CYTO: NORMAL
CYTOLOGY CVX/VAG DOC THIN PREP: NORMAL
DX ICD CODE: NORMAL
HPV I/H RISK 4 DNA CVX QL PROBE+SIG AMP: NEGATIVE
Lab: NORMAL
OTHER STN SPEC: NORMAL
STAT OF ADQ CVX/VAG CYTO-IMP: NORMAL

## 2024-06-07 ENCOUNTER — OFFICE VISIT (OUTPATIENT)
Dept: OBSTETRICS AND GYNECOLOGY | Facility: CLINIC | Age: 44
End: 2024-06-07
Payer: COMMERCIAL

## 2024-06-07 VITALS
DIASTOLIC BLOOD PRESSURE: 76 MMHG | HEIGHT: 64 IN | WEIGHT: 247 LBS | BODY MASS INDEX: 42.17 KG/M2 | SYSTOLIC BLOOD PRESSURE: 116 MMHG

## 2024-06-07 DIAGNOSIS — Z30.433 ENCOUNTER FOR REMOVAL AND REINSERTION OF IUD: Primary | ICD-10-CM

## 2024-06-07 NOTE — PROGRESS NOTES
Procedures  IUD Removal and Insertion Procedure Note    IUD Removal    Type of IUD:  Mirena  Date of insertion:  October 29, 2018  Reason for removal:  Device expiration  Other relevant history/information:  none    Procedure Time Documentation  The risks of the procedure were reviewed with the patient including bleeding, infection and unlikely damage to the uterus and the benefits of the procedure were explained to the patient and Verbal informed consent was obtained    Procedure Details  IUD strings visible:  yes  Local anesthesia:  None  Tenaculum used:  Yes, single tooth  Removal:  IUD strings grasped and IUD removed intact with gentle traction.  The patient tolerated the procedure well.    IUD Insertion    Indication: Desires long acting reversible contraception     Procedure Details   Urine pregnancy test was not done.  The risks (including infection, bleeding, pain, and uterine perforation) and benefits of the procedure were explained to the patient and Verbal informed consent was obtained.      Cervix cleansed with Betadine. Uterus sounded to 7 cm. IUD inserted without difficulty. String visible and trimmed.    IUD Information:  Mirena, Lot # [id number], Expiration date 8/2026, NDC 62264-376-95.    Condition:  Stable    Complications:  None noted and Patient tolerated the procedure well without complications.    Plan:  The patient was advised to call for any fever or for prolonged or severe pain or bleeding. She was advised to use NSAID as needed for mild to moderate pain.     aFcundo Ivory MD  6/7/2024 14:47 EDT

## 2025-05-07 ENCOUNTER — TELEPHONE (OUTPATIENT)
Dept: OBSTETRICS AND GYNECOLOGY | Facility: CLINIC | Age: 45
End: 2025-05-07
Payer: COMMERCIAL